# Patient Record
Sex: FEMALE | Race: WHITE | Employment: FULL TIME | URBAN - METROPOLITAN AREA
[De-identification: names, ages, dates, MRNs, and addresses within clinical notes are randomized per-mention and may not be internally consistent; named-entity substitution may affect disease eponyms.]

---

## 2024-03-09 ENCOUNTER — APPOINTMENT (OUTPATIENT)
Dept: CT IMAGING | Age: 59
End: 2024-03-09
Payer: COMMERCIAL

## 2024-03-09 ENCOUNTER — APPOINTMENT (OUTPATIENT)
Dept: GENERAL RADIOLOGY | Age: 59
End: 2024-03-09
Payer: COMMERCIAL

## 2024-03-09 ENCOUNTER — HOSPITAL ENCOUNTER (EMERGENCY)
Age: 59
Discharge: HOME | End: 2024-03-09
Payer: COMMERCIAL

## 2024-03-09 VITALS
RESPIRATION RATE: 16 BRPM | HEART RATE: 72 BPM | SYSTOLIC BLOOD PRESSURE: 125 MMHG | HEIGHT: 67 IN | TEMPERATURE: 98 F | DIASTOLIC BLOOD PRESSURE: 81 MMHG | BODY MASS INDEX: 26.68 KG/M2 | OXYGEN SATURATION: 96 % | WEIGHT: 170 LBS

## 2024-03-09 DIAGNOSIS — H05.231 PERIORBITAL HEMATOMA OF RIGHT EYE: ICD-10-CM

## 2024-03-09 DIAGNOSIS — S09.90XA CLOSED HEAD INJURY, INITIAL ENCOUNTER: ICD-10-CM
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
## 2024-03-09 PROCEDURE — 70450 CT HEAD/BRAIN W/O DYE: CPT

## 2024-03-09 PROCEDURE — 73030 X-RAY EXAM OF SHOULDER: CPT

## 2024-03-09 PROCEDURE — 70486 CT MAXILLOFACIAL W/O DYE: CPT

## 2024-03-09 PROCEDURE — 99284 EMERGENCY DEPT VISIT MOD MDM: CPT

## 2024-03-09 PROCEDURE — 72125 CT NECK SPINE W/O DYE: CPT

## 2024-03-09 RX ORDER — ATORVASTATIN CALCIUM 80 MG/1
80 TABLET, FILM COATED ORAL DAILY
COMMUNITY

## 2024-03-09 RX ORDER — NITROGLYCERIN 0.4 MG/1
0.4 TABLET SUBLINGUAL EVERY 5 MIN PRN
COMMUNITY

## 2024-03-09 RX ORDER — VALSARTAN 160 MG/1
160 TABLET ORAL DAILY
COMMUNITY

## 2024-03-09 RX ORDER — ASPIRIN 81 MG/1
81 TABLET, CHEWABLE ORAL DAILY
COMMUNITY

## 2024-03-09 ASSESSMENT — PAIN - FUNCTIONAL ASSESSMENT: PAIN_FUNCTIONAL_ASSESSMENT: NONE - DENIES PAIN

## 2024-03-09 NOTE — DISCHARGE INSTRUCTIONS
Continue ice 20 minutes on, 20 minutes off to the right eye.  You may  over-the-counter Arnica gel that is available in the pain relief I will of many drugstores and [facility], it is called Arnicare.  You may apply this to the bruised area around your eye, do not apply it to closely to the eye, will help with pain and may help with some of the bruising.

## 2024-03-09 NOTE — ED PROVIDER NOTES
Independent [facility] Visit.        [facility]  Department of Emergency Medicine   ED  Encounter Note  Admit Date/RoomTime: 3/9/2024  2:46 PM  ED Room:     NAME: Marybel Wilson  : 1965  MRN: [id number]     Chief Complaint:  Fall (Hit right eye and side of face on doorknob and door last night. (+)thinners  Denies LOC)    History of Present Illness       Marybel Wilson is a 58 y.o. old female who presents to the emergency department by private vehicle with complaints of a swelling and bruising around her right eye.  Patient states that she was trying to get her front door open last night, states that the door sticks and she usually has to push on it, states that she pushed and lost her balance and fell forward striking her right eye on the doorknob.  She did not lose consciousness, denies any prodromal symptoms prior to the fall.  Woke up this morning with her right eye very swollen and ecchymotic.  She is on Brilinta.  She denies any headache, endorses some mild bilateral paraspinous neck pain.  Is also complaining of right shoulder pain stating that she believes that she is struck her right shoulder on the door.  She has had no nausea or vomiting.  ROS   Pertinent positives and negatives are stated within HPI, all other systems reviewed and are negative.    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    Surgical History:  has a past surgical history that includes angioplasty and shoulder surgery (Left).    Social History:  reports that she has never smoked. She does not have any smokeless tobacco history on file. She reports current alcohol use. She reports that she does not use drugs.    Family History: family history includes Cancer (age of onset: 58) in her mother.     Allergies: Sulfa antibiotics    Physical Exam   Oxygen Saturation Interpretation: Normal.        ED Triage Vitals [24 1439]   BP Temp Temp src Pulse Respirations  CNP reviewed today's visit with the patient in addition to providing specific details for the plan of care and counseling regarding the diagnosis and prognosis.  Questions are answered at this time and are agreeable with the plan.    Assessment      1. Fall, initial encounter    2. Closed head injury, initial encounter    3. Periorbital hematoma of right eye      Plan   Discharged home.  Patient condition is stable    [facility]  [address] [address]  [phone]  Schedule an appointment as soon as possible for a visit   If symptoms worsen       New Medications     Discharge Medication List as of 3/9/2024  4:32 PM        Electronically signed by DEBRA Kilgore CNP   DD: 3/9/24  **This report was transcribed using voice recognition software. Every effort was made to ensure accuracy; however, inadvertent computerized transcription errors may be present.  END OF ED PROVIDER NOTE

## 2025-02-15 ENCOUNTER — HOSPITAL ENCOUNTER (EMERGENCY)
Age: 60
Discharge: LEFT BEFORE DISCHARGE | End: 2025-02-15
Attending: EMERGENCY MEDICINE
Payer: COMMERCIAL

## 2025-02-15 VITALS
DIASTOLIC BLOOD PRESSURE: 77 MMHG | TEMPERATURE: 98.2 F | HEART RATE: 109 BPM | RESPIRATION RATE: 16 BRPM | SYSTOLIC BLOOD PRESSURE: 142 MMHG | OXYGEN SATURATION: 94 %

## 2025-02-15 DIAGNOSIS — R10.9 ABDOMINAL PAIN, UNSPECIFIED ABDOMINAL LOCATION: Primary | ICD-10-CM

## 2025-02-15 DIAGNOSIS — F10.920 ACUTE ALCOHOLIC INTOXICATION WITHOUT COMPLICATION: ICD-10-CM

## 2025-02-15 PROCEDURE — 99283 EMERGENCY DEPT VISIT LOW MDM: CPT

## 2025-02-15 RX ORDER — 0.9 % SODIUM CHLORIDE 0.9 %
1000 INTRAVENOUS SOLUTION INTRAVENOUS ONCE
Status: DISCONTINUED | OUTPATIENT
Start: 2025-02-15 | End: 2025-02-15

## 2025-02-15 ASSESSMENT — LIFESTYLE VARIABLES
HOW MANY STANDARD DRINKS CONTAINING ALCOHOL DO YOU HAVE ON A TYPICAL DAY: 3 OR 4
HOW OFTEN DO YOU HAVE A DRINK CONTAINING ALCOHOL: 2-4 TIMES A MONTH

## 2025-02-16 NOTE — ED NOTES
HPI:  2/15/25, Time: 10:33 PM BASHIR Wilson is a 59 y.o. female history of carcinoma history of reported heart disease history of high cholesterol presenting to the ED for reported abdominal pain, beginning a short time ago.  The complaint has been persistent, moderate in severity, and worsened by nothing.  Patient reportedly was having abdominal pain.  Patient was arrested by police.  Patient was brought here for clearance.  Patient reporting chest pain as well as abdominal pain. there is no history of vomiting there is no history of fall or injury.  Patient reporting no homicidal suicidal thoughts.  Patient reportedly was aggressive and uncooperative per EMS was brought in handcuffs as well as restraints.  Patient reporting no black or tarry stools or hematemesis.    ROS:   Pertinent positives and negatives are stated within HPI, all other systems reviewed and are negative.  --------------------------------------------- PAST HISTORY ---------------------------------------------  Past Medical History:  has a past medical history of Lobular carcinoma in situ of left breast.    Past Surgical History:  has a past surgical history that includes angioplasty and shoulder surgery (Left).    Social History:  reports that she has never smoked. She does not have any smokeless tobacco history on file. She reports current alcohol use. She reports that she does not use drugs.    Family History: family history includes Cancer (age of onset: 58) in her mother.     The patient’s home medications have been reviewed.    Allergies: Sulfa antibiotics    ---------------------------------------------------PHYSICAL EXAM--------------------------------------    Constitutional/General: Alert and oriented x3,  Head: Normocephalic and atraumatic  Eyes: PERRL, EOMI  Mouth: Oropharynx clear, handling secretions, no trismus  Neck: Supple, full ROM, non tender to palpation in the midline, no stridor, no crepitus, no meningeal

## 2025-02-16 NOTE — ED NOTES
Followed up with Marybel Wilson on 2/15/2025 at 11:16 PM. Patient left the ED with a disposition of AMA on . Patient cited personal obligations as reason. Advised patient to follow up with a primary care physician or return to the Emergency Department if symptoms worsen.   Carlos Pemberton RN     none

## 2025-02-16 NOTE — ED PROVIDER NOTES
reviewed outpatient cardiology notes from 2/13/2025.    CC/HPI Summary, DDx, ED Course, and Reassessment:    59 y.o. female who presents BIBEMS for reported abdominal pain, onset prior to arrival.      On initial evaluation patient in no acute distress, does appear intoxicated.  Vital signs remarkable for tachycardia otherwise unremarkable.  On initial exam lungs and heart sounds unremarkable, abdomen soft nontender nondistended, no gross focal motor deficits.  Presentation concerning for but not limited to: Intoxication, electrolyte abnormality, pancreatitis to name a few.    Labs and imaging were ordered however patient's family arrives, they report patient will leave under their supervision against medical vice.    Any procedures performed detailed above or in procedure section of note.    On reevaluation patient reports no acute complaints, continues to appear intoxicated.    This patient and her family have chosen to sign patient out against medical advice.  I personally explained to patient and family that choosing to do so may result in permanent bodily harm, disability, disfigurement, or death.  I discussed at length that without further evaluation and monitoring there may be unforeseen circumstances and deterioration causing permanent bodily harm or death as a result of their choice.    They are awake and alert. Family has the capacity and are competent at this time to make this decision.  They state that they are aware of the serious risks as explained, but they continue to wish to sign patient out against medical advice.    In light of their decision to leave against medical advice, follow-up has been arranged and they are aware of the importance of following up as instructed.  They have been advised that they should return to the ED immediately if they change their mind at any time, or if their condition begins to change or worsen.  This was witnessed by TEA Beyer as well.        Disposition